# Patient Record
Sex: FEMALE | Race: OTHER | NOT HISPANIC OR LATINO | RURAL
[De-identification: names, ages, dates, MRNs, and addresses within clinical notes are randomized per-mention and may not be internally consistent; named-entity substitution may affect disease eponyms.]

---

## 2018-12-11 ENCOUNTER — OFFICE VISIT (OUTPATIENT)
Dept: RETAIL CLINIC | Facility: CLINIC | Age: 43
End: 2018-12-11

## 2018-12-11 VITALS
RESPIRATION RATE: 15 BRPM | TEMPERATURE: 98.7 F | WEIGHT: 293 LBS | OXYGEN SATURATION: 96 % | BODY MASS INDEX: 43.4 KG/M2 | HEART RATE: 98 BPM | HEIGHT: 69 IN

## 2018-12-11 DIAGNOSIS — K04.7 TOOTH ABSCESS: Primary | ICD-10-CM

## 2018-12-11 PROCEDURE — 99203 OFFICE O/P NEW LOW 30 MIN: CPT | Performed by: NURSE PRACTITIONER

## 2018-12-11 RX ORDER — AMOXICILLIN AND CLAVULANATE POTASSIUM 875; 125 MG/1; MG/1
1 TABLET, FILM COATED ORAL 2 TIMES DAILY
Qty: 14 TABLET | Refills: 0 | Status: SHIPPED | OUTPATIENT
Start: 2018-12-11 | End: 2018-12-18

## 2018-12-11 NOTE — PATIENT INSTRUCTIONS
Dental Abscess  A dental abscess is a collection of pus in or around a tooth.  What are the causes?  This condition is caused by a bacterial infection around the root of the tooth that involves the inner part of the tooth (pulp). It may result from:  · Severe tooth decay.  · Trauma to the tooth that allows bacteria to enter into the pulp, such as a broken or chipped tooth.  · Severe gum disease around a tooth.    What are the signs or symptoms?  Symptoms of this condition include:  · Severe pain in and around the infected tooth.  · Swelling and redness around the infected tooth, in the mouth, or in the face.  · Tenderness.  · Pus drainage.  · Bad breath.  · Bitter taste in the mouth.  · Difficulty swallowing.  · Difficulty opening the mouth.  · Nausea.  · Vomiting.  · Chills.  · Swollen neck glands.  · Fever.    How is this diagnosed?  This condition is diagnosed with examination of the infected tooth. During the exam, your dentist may tap on the infected tooth. Your dentist will also ask about your medical and dental history and may order X-rays.  How is this treated?  This condition is treated by eliminating the infection. This may be done with:  · Antibiotic medicine.  · A root canal. This may be performed to save the tooth.  · Pulling (extracting) the tooth. This may also involve draining the abscess. This is done if the tooth cannot be saved.    Follow these instructions at home:  · Take medicines only as directed by your dentist.  · If you were prescribed antibiotic medicine, finish all of it even if you start to feel better.  · Rinse your mouth (gargle) often with salt water to relieve pain or swelling.  · Do not drive or operate heavy machinery while taking pain medicine.  · Do not apply heat to the outside of your mouth.  · Keep all follow-up visits as directed by your dentist. This is important.  Contact a health care provider if:  · Your pain is worse and is not helped by medicine.  Get help right away  if:  · You have a fever or chills.  · Your symptoms suddenly get worse.  · You have a very bad headache.  · You have problems breathing or swallowing.  · You have trouble opening your mouth.  · You have swelling in your neck or around your eye.  This information is not intended to replace advice given to you by your health care provider. Make sure you discuss any questions you have with your health care provider.  Document Released: 12/18/2006 Document Revised: 04/27/2017 Document Reviewed: 12/15/2015  [employer] Patient Education © 2018 [employer]

## 2018-12-11 NOTE — PROGRESS NOTES
"Subjective   Edwina Xie is a 43 y.o. female.   Pulse 98   Temp 98.7 °F (37.1 °C)   Resp 15   Ht 175.3 cm (69\")   Wt (!) 143 kg (315 lb 3.2 oz)   LMP 11/15/2018   SpO2 96%   BMI 46.55 kg/m²   History reviewed. No pertinent past medical history.      Dental Pain    This is a new problem. The current episode started in the past 7 days. The problem has been gradually worsening. The pain is severe. Associated symptoms include facial pain, sinus pressure and thermal sensitivity. Pertinent negatives include no difficulty swallowing, fever or oral bleeding.        The following portions of the patient's history were reviewed and updated as appropriate: allergies, current medications, past family history, past medical history, past social history, past surgical history and problem list.    Review of Systems   Constitutional: Negative for fever.   HENT: Positive for sinus pressure.        Objective   Physical Exam   Constitutional: She appears well-developed and well-nourished.   HENT:   Head: Normocephalic and atraumatic.   Mouth/Throat:       Cardiovascular: Regular rhythm and normal heart sounds.   Pulmonary/Chest: Effort normal. She has no wheezes. She has no rhonchi. She has no rales.   Lymphadenopathy:     She has no cervical adenopathy.   Skin: Skin is warm and dry.       Assessment/Plan   Edwina was seen today for dental pain.    Diagnoses and all orders for this visit:    Tooth abscess    Other orders  -     amoxicillin-clavulanate (AUGMENTIN) 875-125 MG per tablet; Take 1 tablet by mouth 2 (Two) Times a Day for 7 days.      Pt advised that this will be a recurrent issue if the dental martin is not fixed. Seek treatment at her local dentis.          "